# Patient Record
Sex: MALE | Race: WHITE | NOT HISPANIC OR LATINO | URBAN - METROPOLITAN AREA
[De-identification: names, ages, dates, MRNs, and addresses within clinical notes are randomized per-mention and may not be internally consistent; named-entity substitution may affect disease eponyms.]

---

## 2017-01-26 ENCOUNTER — TELEPHONE (OUTPATIENT)
Dept: CARDIOLOGY | Facility: CLINIC | Age: 27
End: 2017-01-26

## 2017-01-26 NOTE — TELEPHONE ENCOUNTER
----- Message from Chloe Brice MA sent at 1/24/2017  2:16 PM EST -----  Contact: [phone]      ----- Message -----     From: Cierra Still     Sent: 1/24/2017   1:52 PM       To: Chloe Brice MA    Having a Dental Implant on 1/27 and his dentist needs a Clearance letter (PFO). Does he need Premedication?  Dr Primo Molina Fax #[phone]